# Patient Record
Sex: MALE | Race: WHITE
[De-identification: names, ages, dates, MRNs, and addresses within clinical notes are randomized per-mention and may not be internally consistent; named-entity substitution may affect disease eponyms.]

---

## 2019-08-26 ENCOUNTER — HOSPITAL ENCOUNTER (EMERGENCY)
Dept: HOSPITAL 36 - ER | Age: 54
Discharge: HOME | End: 2019-08-26
Payer: COMMERCIAL

## 2019-08-26 DIAGNOSIS — R20.0: Primary | ICD-10-CM

## 2019-08-26 LAB
ALBUMIN SERPL-MCNC: 3.9 GM/DL (ref 4.2–5.5)
ALBUMIN/GLOB SERPL: 1.5 {RATIO} (ref 1–1.8)
ALP SERPL-CCNC: 45 U/L (ref 34–104)
ALT SERPL-CCNC: 15 U/L (ref 7–52)
ANION GAP SERPL CALC-SCNC: 10.4 MMOL/L (ref 7–16)
APPEARANCE UR: CLEAR
AST SERPL-CCNC: 22 U/L (ref 13–39)
BACTERIA #/AREA URNS HPF: (no result) /HPF
BASOPHILS # BLD AUTO: 0 TH/CUMM (ref 0–0.2)
BASOPHILS NFR BLD AUTO: 0 % (ref 0–2)
BILIRUB SERPL-MCNC: 0.7 MG/DL (ref 0.3–1)
BILIRUB UR-MCNC: NEGATIVE MG/DL
BUN SERPL-MCNC: 14 MG/DL (ref 7–25)
CALCIUM SERPL-MCNC: 9 MG/DL (ref 8.6–10.3)
CHLORIDE SERPL-SCNC: 98 MEQ/L (ref 98–107)
CK SERPL-CCNC: 152 U/L (ref 30–223)
CO2 SERPL-SCNC: 27.9 MEQ/L (ref 21–31)
COLOR UR: YELLOW
CREAT SERPL-MCNC: 0.8 MG/DL (ref 0.7–1.3)
EOSINOPHIL # BLD AUTO: 0.2 TH/CMM (ref 0.1–0.4)
EOSINOPHIL NFR BLD AUTO: 2.8 % (ref 0–5)
EPI CELLS URNS QL MICRO: (no result) /LPF
ERYTHROCYTE [DISTWIDTH] IN BLOOD BY AUTOMATED COUNT: 14.7 % (ref 11.5–20)
GLOBULIN SER-MCNC: 2.6 GM/DL
GLUCOSE SERPL-MCNC: 96 MG/DL (ref 70–105)
GLUCOSE UR STRIP-MCNC: NEGATIVE MG/DL
HCT VFR BLD CALC: 34.4 % (ref 41–60)
HGB BLD-MCNC: 11.7 GM/DL (ref 12–16)
KETONES UR STRIP-MCNC: NEGATIVE MG/DL
LEUKOCYTE ESTERASE UR-ACNC: NEGATIVE
LYMPHOCYTE AB SER FC-ACNC: 2.9 TH/CMM (ref 1.5–3)
LYMPHOCYTES NFR BLD AUTO: 41.3 % (ref 20–50)
MCH RBC QN AUTO: 26.9 PG (ref 26–30)
MCHC RBC AUTO-ENTMCNC: 34 PG (ref 28–36)
MCV RBC AUTO: 79.2 FL (ref 80–99)
MICRO URNS: YES
MONOCYTES # BLD AUTO: 0.7 TH/CMM (ref 0.3–1)
MONOCYTES NFR BLD AUTO: 10.2 % (ref 2–10)
NEUTROPHILS # BLD: 3.3 TH/CMM (ref 1.8–8)
NEUTROPHILS NFR BLD AUTO: 45.7 % (ref 40–80)
NITRITE UR QL STRIP: NEGATIVE
PH UR STRIP: 6 [PH] (ref 4.6–8)
PLATELET # BLD: 301 TH/CMM (ref 150–400)
POTASSIUM SERPL-SCNC: 3.3 MEQ/L (ref 3.5–5.1)
PROT UR STRIP-MCNC: NEGATIVE MG/DL
RBC # BLD AUTO: 4.34 MIL/CMM (ref 4.3–5.7)
RBC # UR STRIP: NEGATIVE /UL
RBC #/AREA URNS HPF: (no result) /HPF (ref 0–5)
SODIUM SERPL-SCNC: 133 MEQ/L (ref 136–145)
SP GR UR STRIP: <= 1.005 (ref 1–1.03)
URINALYSIS COMPLETE PNL UR: (no result)
UROBILINOGEN UR STRIP-ACNC: 0.2 E.U./DL (ref 0.2–1)
WBC # BLD AUTO: 7.1 TH/CMM (ref 4.8–10.8)
WBC #/AREA URNS HPF: (no result) /HPF (ref 0–5)

## 2019-08-26 PROCEDURE — Z7610: HCPCS

## 2019-08-26 NOTE — ED PHYSICIAN CHART
ED Chief Complaint/HPI





- Patient Information


Date Seen:: 08/26/19


Time Seen:: 01:10


Chief Complaint:: numbness arms


History of Present Illness:: 





53 yr old male with c/os of numness both arms while drning pt panicked and 

thought it was the big one no nv no dizziness or headache


Allergies:: 


 Allergies











Allergy/AdvReac Type Severity Reaction Status Date / Time


 


No Known Allergies Allergy   Verified 08/26/19 00:57











Vitals:: 


 Vital Signs - 8 hr











  08/26/19





  00:45


 


Temp 97.7 F


 


HR 58


 


RR 18


 


/89


 


O2 Sat % 100














ED Review of Systems





- Review of Systems


General/Constitutional: No fever


Skin: No skin lesions, No rash, No bruising


Head: No headache


Eyes: No loss of vision


ENT: No earache


Neck: No neck pain


Cardio Vascular: No chest pain


Pulmonary: No SOB


GI: No vomiting


G/U: No dysuria


Ob/Gyn: No abnormal vaginal bleed


Musculoskeletal: No bone or joint pain


Endocrine: No polyuria


Psychiatric: No prior psych history


Hematopoietic: No bruising


Allergic/Immuno: No urticaria


Neurological: No focal symptoms





ED Past Medical History





- Past Medical History


Past Medical History: No significant medical hx





Family Medical History





- Family Member


  ** Mother


History Unknown: Yes





ED Physical Exam





- Physical Examination


General/Constitutional: Awake, Well-developed, well-nourished, Alert, No 

distress, GCS 15, Non-toxic appearing, Ambulatory


Head: Atraumatic


Eyes: Lids, conjuctiva normal, PERRL, EOMI


Skin: Nl inspection, No rash, No skin lesions, No ecchymosis, Well hydrated, No 

lymphadenopathy


ENMT: External ears, nose nl, Nasal exam nl, Lips, teeth, gums nl


Neck: Nontender, Full ROM w/o pain, No JVD, No nuchal rigidity, No bruit, No 

mass, No stridor


Respiratory: Nl effort/Exclusion, Clear to Auscultation, No Wheeze/Rhonchi/Rales


Cardio Vascular: RRR, No murmur, gallop, rubs, NL S1 S2


GI: No tenderness/rebounding/guarding, No organomegaly, No hernia, Normal BS's, 

Nondistended, No mass/bruits, No McBurney tenderness


: No CVA tenderness


Extremities: No tenderness or effusion, Full ROM, normal strength in all 

extremities, No edema, Normal digits & nails


Neuro/Psych: Alert/oriented, DTR's symmetric, Normal sensory exam, Normal motor 

strength, Judgement/insight normal, Mood normal, Normal gait, No focal deficits


Misc: Normal back, No paraspinal tenderness





ED Assessment





- Assessment


General Assessment: 





numbness arms





ED Septic Shock





- .


Is Septic Shock (SBP<90, OR Lactate>4 mmol\L) present?: No





- <6hrs of presentation:


Vital Signs: 


 Vital Signs - 8 hr











  08/26/19





  00:45


 


Temp 97.7 F


 


HR 58


 


RR 18


 


/89


 


O2 Sat % 100














ED Reassessment (Disposition)





- Reassessment


Reassessment:: 





numbness arms





- Diagnosis


Diagnosis:: 





as above nmbneess





- Patient Disposition


Discharge/Transfer:: Home

## 2019-08-26 NOTE — DIAGNOSTIC IMAGING REPORT
CT scan of the brain without contrast 



History: Numbness



Total DLP equals 652



CTDI equals 38.2



Axial sections were obtained from the base of the skull to the vertex.



There is a normal ventricular system size. No focal parenchymal lesions

are seen. No evidence of any mass effect or shift of midline structures.

No extra-axial masses or abnormal fluid collections.



Impression: Negative examination